# Patient Record
Sex: FEMALE | Race: BLACK OR AFRICAN AMERICAN | NOT HISPANIC OR LATINO | Employment: FULL TIME | URBAN - METROPOLITAN AREA
[De-identification: names, ages, dates, MRNs, and addresses within clinical notes are randomized per-mention and may not be internally consistent; named-entity substitution may affect disease eponyms.]

---

## 2023-10-29 ENCOUNTER — HOSPITAL ENCOUNTER (EMERGENCY)
Facility: HOSPITAL | Age: 23
Discharge: HOME | End: 2023-10-29
Payer: MEDICAID

## 2023-10-29 ENCOUNTER — APPOINTMENT (OUTPATIENT)
Dept: RADIOLOGY | Facility: HOSPITAL | Age: 23
End: 2023-10-29
Payer: MEDICAID

## 2023-10-29 VITALS
SYSTOLIC BLOOD PRESSURE: 108 MMHG | BODY MASS INDEX: 20.87 KG/M2 | HEIGHT: 66 IN | HEART RATE: 78 BPM | OXYGEN SATURATION: 100 % | WEIGHT: 129.85 LBS | DIASTOLIC BLOOD PRESSURE: 76 MMHG | TEMPERATURE: 97.9 F

## 2023-10-29 DIAGNOSIS — S49.92XA INJURY OF LEFT SHOULDER, INITIAL ENCOUNTER: Primary | ICD-10-CM

## 2023-10-29 PROCEDURE — 73030 X-RAY EXAM OF SHOULDER: CPT | Mod: LT

## 2023-10-29 PROCEDURE — 99283 EMERGENCY DEPT VISIT LOW MDM: CPT | Mod: 25

## 2023-10-29 PROCEDURE — 73030 X-RAY EXAM OF SHOULDER: CPT | Mod: LEFT SIDE | Performed by: RADIOLOGY

## 2023-10-29 RX ORDER — ACETAMINOPHEN 500 MG
1000 TABLET ORAL EVERY 8 HOURS PRN
Qty: 18 TABLET | Refills: 0 | Status: SHIPPED | OUTPATIENT
Start: 2023-10-29 | End: 2023-11-01

## 2023-10-29 RX ORDER — ACETAMINOPHEN 325 MG/1
975 TABLET ORAL ONCE
Status: COMPLETED | OUTPATIENT
Start: 2023-10-29 | End: 2023-10-29

## 2023-10-29 RX ADMIN — ACETAMINOPHEN 975 MG: 325 TABLET ORAL at 16:16

## 2023-10-29 ASSESSMENT — PAIN DESCRIPTION - ORIENTATION: ORIENTATION: LEFT

## 2023-10-29 ASSESSMENT — COLUMBIA-SUICIDE SEVERITY RATING SCALE - C-SSRS
2. HAVE YOU ACTUALLY HAD ANY THOUGHTS OF KILLING YOURSELF?: NO
6. HAVE YOU EVER DONE ANYTHING, STARTED TO DO ANYTHING, OR PREPARED TO DO ANYTHING TO END YOUR LIFE?: NO
1. IN THE PAST MONTH, HAVE YOU WISHED YOU WERE DEAD OR WISHED YOU COULD GO TO SLEEP AND NOT WAKE UP?: NO

## 2023-10-29 ASSESSMENT — PAIN SCALES - GENERAL: PAINLEVEL_OUTOF10: 8

## 2023-10-29 ASSESSMENT — PAIN DESCRIPTION - PAIN TYPE: TYPE: ACUTE PAIN

## 2023-10-29 ASSESSMENT — PAIN - FUNCTIONAL ASSESSMENT: PAIN_FUNCTIONAL_ASSESSMENT: 0-10

## 2023-10-29 ASSESSMENT — PAIN DESCRIPTION - LOCATION: LOCATION: SHOULDER

## 2023-10-29 NOTE — ED PROVIDER NOTES
"HPI   Chief Complaint   Patient presents with    Shoulder Injury     Pt is coming from work with left shoulder pain due to an injury at work. 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 Pt is a&ox4, warm and dry. PMS intact. No other injuries reported.        23-year-old female with no significant past medical history presents the ED today for chief concern of left shoulder pain.  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  She states that she \"felt a pull\" in her left shoulder and had pain in the lateral aspect of the left shoulder.  She describes the pain as throbbing.  She did not take any medicines for symptoms.  The injury occurred an hour ago.  She did not fall to the ground, hit her head, or lose consciousness.  She is not anticoagulated.  Describes the pain as aching worse with movement.  Denies any numbness or tingling.  Denies any weakness.  No fever/chills, nausea/vomiting.  Denies any chest pain or shortness of breath.  Denies any neck pain or elbow pain.  No other symptoms or concerns at this time.  First date of last menstrual period was about a month ago.  No concern for pregnancy at this time.  Denies any other injuries.      History provided by:  Patient and relative   used: No                        No data recorded                Patient History   No past medical history on file.  No past surgical history on file.  No family history on file.  Social History     Tobacco Use    Smoking status: Not on file    Smokeless tobacco: Not on file   Substance Use Topics    Alcohol use: Not on file    Drug use: Not on file       Physical Exam   ED Triage Vitals [10/29/23 1553]   Temp Heart Rate Resp BP   36.6 °C (97.9 °F) 78 -- 108/76      SpO2 Temp Source Heart Rate Source Patient Position   100 % Oral -- Sitting      BP Location FiO2 (%)     Right arm --       Physical Exam  Constitutional: Vital signs per nursing notes.  Well " developed, well nourished.  No acute distress.    Psychiatric: alert and oriented to person, place, and time; no abnormalities of mood or affect; memory intact  Eyes: conjunctivae and lids normal  ENT: Ears normal externally; face symmetric. voice normal  Neck: neck supple, no meningismus; trachea midline without deviation  Respiratory: normal respiratory effort and excursion; no rales, rhonchi, or wheezes; equal air entry  Cardiovascular: RRR, 2+ radial pulses extremities   Neurological: normal speech; CN II-XII grossly intact, normal motor and sensory function; no nystagmus; ; no ataxia.  GI: no distention, soft, nontender  : Deferred  Musculoskeletal: normal gait and station; normal digits and nails; no overlying skin changes or obvious deformity to the left shoulder.  Minimal tenderness palpation over the lateral aspect of the left shoulder as well as posterior aspect of left shoulder.  Remainder of shoulder unremarkable.  Full range of motion of the neck.  No C-spine tenderness.  Full range of motion of the left elbow without any focal bony tenderness palpation.  Full active and passive range of motion of the left shoulder.  Pain with active range of motion of the left shoulder.  5/5 strength in upper extremities bilaterally.  Sensation is intact in the upper extremities bilaterally.  Neurovascular status intact.  Compartments are soft.  2+ equal radial pulses bilaterally.  No cyanosis.  Temperature is equal in the upper extremities bilaterally.  Negative drop arm test.  Negative Jed test.  Negative external rotation resistance test.  Negative empty can test.  Negative hook test.  Skin: normal to inspection; normal to palpation; no rash    ED Course & MDM   ED Course as of 10/29/23 1748   Sun Oct 29, 2023   1738 FINDINGS:  3 portable views of the left shoulder show no fracture, dislocation  or lytic or blastic lesion. There is no evidence of arthritis or  calcific tendinitis.      IMPRESSION:  Normal  study. []      ED Course User Index  [MC] Bruce Valentin PA-C         Diagnoses as of 10/29/23 1748   Injury of left shoulder, initial encounter       Medical Decision Making  23-year-old female with no significant past medical history presents the ED today for chief concern of left shoulder injury.  Vital signs are reassuring.  Patient overall appears well and is nontoxic-appearing.  She is given Tylenol in the ED.  The shoulder pain was due to an injury.  Patient felt much better after administration of Tylenol in the ED.  She has clear musculoskeletal tenderness noted on examination.  No weakness is noted. SITS tendons appear to be intact.  No weakness is noted.  I have low suspicion for any tendon injury at this time.  She has pain with active range of motion, no weakness.  No signs or symptoms of any ACS at this time.  Neurovascular status intact in upper extremities bilaterally.  X-rays negative for any fracture.  Discussed my impression and findings with patient and she feels comfortable returning home.  We discussed very strict return precautions including returning for any new or worsening signs or symptoms.  Patient is in agreement this plan.  She will follow-up with her PCP and orthopedics within 3 days.  Shared decision-making was used and sling will be deferred at this time.  Discharged with Tylenol.  Discussed with patient and sister that x-rays can be negative for fracture up to 14 days after the initial injury.  I do not think immobilization is necessary at this time as patient has no focal bony tenderness.  Low suspicion for any occult fracture at this time.    Differential diagnosis: Fracture, dislocation, ligamentous injury, ACS, aortic dissection    Disposition/treatment  1.  Injury of left shoulder    Shared decision-making was used patient feels comfortable returning home        Procedure  Procedures     Bruce Valentin PA-C  10/29/23 2367

## 2023-10-29 NOTE — DISCHARGE INSTRUCTIONS
Please return to the ED immediately if you have any new or worsening signs or symptoms.  Return to the ED immediately if you have any pain out of proportion not being relieved by analgesics.

## 2023-11-03 ENCOUNTER — HOSPITAL ENCOUNTER (EMERGENCY)
Facility: HOSPITAL | Age: 23
Discharge: HOME | End: 2023-11-03
Attending: EMERGENCY MEDICINE
Payer: COMMERCIAL

## 2023-11-03 ENCOUNTER — APPOINTMENT (OUTPATIENT)
Dept: RADIOLOGY | Facility: HOSPITAL | Age: 23
End: 2023-11-03
Payer: COMMERCIAL

## 2023-11-03 VITALS
DIASTOLIC BLOOD PRESSURE: 68 MMHG | RESPIRATION RATE: 18 BRPM | WEIGHT: 132.28 LBS | SYSTOLIC BLOOD PRESSURE: 124 MMHG | TEMPERATURE: 98.1 F | HEART RATE: 86 BPM | OXYGEN SATURATION: 100 % | BODY MASS INDEX: 20.76 KG/M2 | HEIGHT: 67 IN

## 2023-11-03 DIAGNOSIS — S49.92XA INJURY OF LEFT SHOULDER, INITIAL ENCOUNTER: Primary | ICD-10-CM

## 2023-11-03 LAB
APPEARANCE UR: ABNORMAL
BACTERIA #/AREA URNS AUTO: ABNORMAL /HPF
BILIRUB UR STRIP.AUTO-MCNC: NEGATIVE MG/DL
COLOR UR: YELLOW
GLUCOSE UR STRIP.AUTO-MCNC: NEGATIVE MG/DL
HCG UR QL IA.RAPID: NEGATIVE
KETONES UR STRIP.AUTO-MCNC: NEGATIVE MG/DL
LEUKOCYTE ESTERASE UR QL STRIP.AUTO: ABNORMAL
MUCOUS THREADS #/AREA URNS AUTO: ABNORMAL /LPF
NITRITE UR QL STRIP.AUTO: NEGATIVE
PH UR STRIP.AUTO: 6 [PH]
PROT UR STRIP.AUTO-MCNC: NEGATIVE MG/DL
RBC # UR STRIP.AUTO: NEGATIVE /UL
RBC #/AREA URNS AUTO: ABNORMAL /HPF
SP GR UR STRIP.AUTO: 1.02
SQUAMOUS #/AREA URNS AUTO: ABNORMAL /HPF
UROBILINOGEN UR STRIP.AUTO-MCNC: <2 MG/DL
WBC #/AREA URNS AUTO: ABNORMAL /HPF

## 2023-11-03 PROCEDURE — 73030 X-RAY EXAM OF SHOULDER: CPT | Mod: LEFT SIDE | Performed by: RADIOLOGY

## 2023-11-03 PROCEDURE — 96372 THER/PROPH/DIAG INJ SC/IM: CPT

## 2023-11-03 PROCEDURE — 2500000005 HC RX 250 GENERAL PHARMACY W/O HCPCS

## 2023-11-03 PROCEDURE — 81025 URINE PREGNANCY TEST: CPT

## 2023-11-03 PROCEDURE — 73030 X-RAY EXAM OF SHOULDER: CPT | Mod: LT,FR

## 2023-11-03 PROCEDURE — 2500000004 HC RX 250 GENERAL PHARMACY W/ HCPCS (ALT 636 FOR OP/ED)

## 2023-11-03 PROCEDURE — 87086 URINE CULTURE/COLONY COUNT: CPT | Mod: AHULAB

## 2023-11-03 PROCEDURE — 81001 URINALYSIS AUTO W/SCOPE: CPT

## 2023-11-03 PROCEDURE — 99284 EMERGENCY DEPT VISIT MOD MDM: CPT | Mod: 25 | Performed by: EMERGENCY MEDICINE

## 2023-11-03 RX ORDER — LIDOCAINE 50 MG/G
1 PATCH TOPICAL DAILY
Qty: 5 PATCH | Refills: 0 | Status: SHIPPED | OUTPATIENT
Start: 2023-11-03 | End: 2023-11-03 | Stop reason: WASHOUT

## 2023-11-03 RX ORDER — LIDOCAINE 50 MG/G
1 PATCH TOPICAL DAILY
Qty: 5 PATCH | Refills: 0 | Status: SHIPPED | OUTPATIENT
Start: 2023-11-03 | End: 2023-11-08

## 2023-11-03 RX ORDER — KETOROLAC TROMETHAMINE 30 MG/ML
30 INJECTION, SOLUTION INTRAMUSCULAR; INTRAVENOUS ONCE
Status: COMPLETED | OUTPATIENT
Start: 2023-11-03 | End: 2023-11-03

## 2023-11-03 RX ORDER — ACETAMINOPHEN 500 MG
1000 TABLET ORAL EVERY 8 HOURS PRN
Qty: 18 TABLET | Refills: 0 | Status: SHIPPED | OUTPATIENT
Start: 2023-11-03 | End: 2023-11-06

## 2023-11-03 RX ORDER — CYCLOBENZAPRINE HCL 10 MG
10 TABLET ORAL NIGHTLY
Qty: 3 TABLET | Refills: 0 | Status: SHIPPED | OUTPATIENT
Start: 2023-11-03 | End: 2023-11-17 | Stop reason: ALTCHOICE

## 2023-11-03 RX ORDER — LIDOCAINE 560 MG/1
1 PATCH PERCUTANEOUS; TOPICAL; TRANSDERMAL ONCE
Status: DISCONTINUED | OUTPATIENT
Start: 2023-11-03 | End: 2023-11-03 | Stop reason: HOSPADM

## 2023-11-03 RX ORDER — ACETAMINOPHEN 325 MG/1
975 TABLET ORAL ONCE
Status: COMPLETED | OUTPATIENT
Start: 2023-11-03 | End: 2023-11-03

## 2023-11-03 RX ORDER — NAPROXEN 500 MG/1
500 TABLET ORAL EVERY 12 HOURS
Qty: 6 TABLET | Refills: 0 | Status: SHIPPED | OUTPATIENT
Start: 2023-11-03 | End: 2023-11-06

## 2023-11-03 RX ADMIN — ACETAMINOPHEN 975 MG: 325 TABLET ORAL at 16:01

## 2023-11-03 RX ADMIN — LIDOCAINE 1 PATCH: 4 PATCH TOPICAL at 16:01

## 2023-11-03 RX ADMIN — KETOROLAC TROMETHAMINE 30 MG: 30 INJECTION, SOLUTION INTRAMUSCULAR at 17:20

## 2023-11-03 ASSESSMENT — PAIN - FUNCTIONAL ASSESSMENT: PAIN_FUNCTIONAL_ASSESSMENT: 0-10

## 2023-11-03 ASSESSMENT — PAIN SCALES - GENERAL: PAINLEVEL_OUTOF10: 7

## 2023-11-03 ASSESSMENT — COLUMBIA-SUICIDE SEVERITY RATING SCALE - C-SSRS
2. HAVE YOU ACTUALLY HAD ANY THOUGHTS OF KILLING YOURSELF?: NO
1. IN THE PAST MONTH, HAVE YOU WISHED YOU WERE DEAD OR WISHED YOU COULD GO TO SLEEP AND NOT WAKE UP?: NO
6. HAVE YOU EVER DONE ANYTHING, STARTED TO DO ANYTHING, OR PREPARED TO DO ANYTHING TO END YOUR LIFE?: NO

## 2023-11-03 ASSESSMENT — PAIN DESCRIPTION - LOCATION: LOCATION: SHOULDER

## 2023-11-03 ASSESSMENT — PAIN DESCRIPTION - ORIENTATION: ORIENTATION: LEFT

## 2023-11-03 NOTE — DISCHARGE INSTRUCTIONS
Return to the emergency department immediately if you are having any new or worsening signs or symptoms

## 2023-11-03 NOTE — Clinical Note
Jacqui Spaulding was seen and treated in our emergency department on 11/3/2023.  She may return to work on 11/04/2023.       If you have any questions or concerns, please don't hesitate to call.      Bruce Valentin PA-C

## 2023-11-03 NOTE — ED PROVIDER NOTES
HPI   Chief Complaint   Patient presents with    Shoulder Injury     LEFT       23-year-old female with no significant past medical history presents the ED today with a chief concern of left shoulder pain.  Patient states symptoms started 6 days ago.  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  She states that she felt some pain in her left shoulder at the time.  She states that the pain is located over the lateral aspect of the left shoulder without any radiation.  Describes the pain as aching.  Denies any exacerbating or relieving factors.  She has been taking ibuprofen at home for her symptoms.  She states that she was here 10/29/2023.  She states that she was given analgesics but they were sent to the wrong pharmacy so she did not pick them up.  She states that her coworkers told her to come to the ED to get reevaluated.  She states that she did not follow-up with orthopedics.  She has no fever/chills, nausea/vomiting, chest pain, shortness of breath.  No other symptoms or concerns at this time.  Denies any numbness or weakness or tingling.      History provided by:  Patient   used: No                        No data recorded                Patient History   No past medical history on file.  No past surgical history on file.  No family history on file.  Social History     Tobacco Use    Smoking status: Not on file    Smokeless tobacco: Not on file   Substance Use Topics    Alcohol use: Not on file    Drug use: Not on file       Physical Exam   ED Triage Vitals   Temp Heart Rate Resp BP   11/03/23 1414 11/03/23 1414 11/03/23 1414 11/03/23 1414   36.7 °C (98.1 °F) 86 18 124/68      SpO2 Temp Source Heart Rate Source Patient Position   11/03/23 1413 11/03/23 1414 11/03/23 1414 11/03/23 1414   100 % Tympanic Monitor Sitting      BP Location FiO2 (%)     11/03/23 1414 --     Right arm        Physical Exam  Constitutional: Vital signs per nursing notes.  Well  developed, well nourished.  No acute distress.    Psychiatric: alert and oriented to person, place, and time; no abnormalities of mood or affect; memory intact  Eyes: conjunctivae and lids normal  ENT: Ears normal externally; face symmetric. voice normal  Neck: neck supple, no meningismus; trachea midline without deviation  Respiratory: normal respiratory effort and excursion; no rales, rhonchi, or wheezes; equal air entry  Cardiovascular: RRR, 2+ radial pulses extremities   Neurological: normal speech; CN II-XII grossly intact, normal motor and sensory function no ataxia.  GI: no distention, soft, nontender  : Deferred  Musculoskeletal: normal gait and station; normal digits and nails; no overlying skin changes to the left shoulder.  Full range of motion of the shoulders and elbows bilaterally.  Tenderness palpation over the lateral aspect of the left shoulder without any radiation.  Tenderness palpation over the bicipital groove.  5/5 strength in upper extremities bilaterally.  Sensation is intact in the upper extremities bilaterally.  Neurovascular status intact compartments are soft.  No cyanosis.  2+ metric radial pulses bilaterally.  Negative Jed test.  Negative external rotation test.  Negative drop arm test.   Skin: normal to inspection; normal to palpation; no rash    ED Course & MDM   ED Course as of 11/03/23 1726   Fri Nov 03, 2023   1537 IMPRESSION:  No acute osseous abnormality.   []   1604 Urine pregnancy test negative. []   1605 Urinalysis shows no UTI []      ED Course User Index  [] Bruce Valentin PA-C         Diagnoses as of 11/03/23 1726   Injury of left shoulder, initial encounter       Medical Decision Making  23-year-old female with no significant past medical history presents the ED today for chief concern of left shoulder pain.  Vital signs are reassuring.  Patient overall appears well and is nontoxic-appearing.  X-ray shows no evidence of fracture.  Neurovascular status intact  in upper extremities bilaterally.  She was given a dose of Toradol in the ED.  Her ligamentous testing is intact.  I do not think immobilization sling is indicated at this time.  Urinalysis unremarkable.  Signs and symptoms likely related to contusion versus sprain.  Discussed my impression and findings with patient and she feels comfortable returning home.  No concerning findings for ACS at this time.  2+ symmetric radial pulses bilaterally.  We discussed very strict return precautions including returning for any new or worsening signs or symptoms.  Patient is in agreement this plan.  She will follow-up with her PCP and orthopedics within 3 days.  Discharged with Flexeril, naproxen, and topical lidocaine patches.  These were printed out so she can take with to any pharmacy.    Differential diagnosis: Strain, sprain, ligamentous injury, septic arthritis, crystal arthropathy, fracture    Disposition/treatment  1.  Injury of left shoulder    Shared decision-making was used patient feels comfortable returning home        Procedure  Procedures     Bruce Valentin PA-C  11/03/23 2898

## 2023-11-03 NOTE — ED TRIAGE NOTES
C/O LEFT SHOULDER PAIN D/T INJURING IT AT WORK ON SUNDAY, INCREASED PAIN WITH ROM, DENIES TAKING ANY OTC MEDS FOR PAIN RELIEF

## 2023-11-05 LAB — BACTERIA UR CULT: NORMAL

## 2023-11-17 ENCOUNTER — OFFICE VISIT (OUTPATIENT)
Dept: ORTHOPEDIC SURGERY | Facility: CLINIC | Age: 23
End: 2023-11-17
Payer: MEDICAID

## 2023-11-17 VITALS — WEIGHT: 132.28 LBS | BODY MASS INDEX: 21.26 KG/M2 | HEIGHT: 66 IN

## 2023-11-17 DIAGNOSIS — M25.512 LEFT SHOULDER PAIN, UNSPECIFIED CHRONICITY: Primary | ICD-10-CM

## 2023-11-17 DIAGNOSIS — S43.402A SPRAIN OF LEFT SHOULDER, UNSPECIFIED SHOULDER SPRAIN TYPE, INITIAL ENCOUNTER: ICD-10-CM

## 2023-11-17 PROCEDURE — 1036F TOBACCO NON-USER: CPT | Performed by: PHYSICIAN ASSISTANT

## 2023-11-17 PROCEDURE — 99213 OFFICE O/P EST LOW 20 MIN: CPT | Performed by: PHYSICIAN ASSISTANT

## 2023-11-17 PROCEDURE — 99203 OFFICE O/P NEW LOW 30 MIN: CPT | Performed by: PHYSICIAN ASSISTANT

## 2023-11-17 ASSESSMENT — PATIENT HEALTH QUESTIONNAIRE - PHQ9
SUM OF ALL RESPONSES TO PHQ9 QUESTIONS 1 AND 2: 0
1. LITTLE INTEREST OR PLEASURE IN DOING THINGS: NOT AT ALL
2. FEELING DOWN, DEPRESSED OR HOPELESS: NOT AT ALL

## 2023-11-17 ASSESSMENT — ENCOUNTER SYMPTOMS
LOSS OF SENSATION IN FEET: 0
DEPRESSION: 0
OCCASIONAL FEELINGS OF UNSTEADINESS: 0

## 2023-11-17 ASSESSMENT — PAIN - FUNCTIONAL ASSESSMENT: PAIN_FUNCTIONAL_ASSESSMENT: 0-10

## 2023-11-17 ASSESSMENT — PAIN DESCRIPTION - DESCRIPTORS: DESCRIPTORS: THROBBING;ACHING

## 2023-11-17 ASSESSMENT — PAIN SCALES - GENERAL: PAINLEVEL_OUTOF10: 8

## 2023-11-17 NOTE — LETTER
November 17, 2023     Patient: Jacqui Spaulding   YOB: 2000   Date of Visit: 11/17/2023       To Whom It May Concern:    It is my medical opinion that Jacqui Spaulding may return to work on 1/2/2024 .    If you have any questions or concerns, please don't hesitate to call.         Sincerely,        Akanksha Gamez PA-C    CC: No Recipients

## 2023-11-17 NOTE — PROGRESS NOTES
Subjective      Chief Complaint   Patient presents with    Left Shoulder - Follow-up        No past surgical history on file.     HPI  This 23 year old patient presents today with left shoulder pain 8. The patient states that the left shoulder pain has been present since October 29th. She has been to the ER twice on 10/29/2023 and 11/3/2023 for left shoulder pain. She denies cardiac history. She denies chest pain, and shortness of breath. She was at work when she suffered an injury to her left shoulder on 10/29/2023. She was picking up a case at work when she felt a pop in her left shoulder. The patient states that the left shoulder pain is worse with and aggravated by reaching and lifting. It impairs her ability to do her normal activities of daily living including her duties at her job. The patient states that this shoulder pain is disabling and presents today to discuss further options. The patient states that they have tried tylenol and ibuprofen with no relief.    CARDIOLOGY:   Negative for chest pain, shortness of breath.   RESPIRATORY:   Negative for chest pain, shortness of breath.   MUSCULOSKELETAL:   See HPI for details.   NEUROLOGY:   Negative for tingling, numbness, weakness.    Objective      There were no vitals taken for this visit.     SHOULDER EXAM  Constitutional: Appears stated age. No apparent distress  Labored Breathing: No  Psychiatric: Normal mood and effect.   Neurological: alert and oriented x3  Skin: intact  HEENT: No bruising, otorrhea, rhinorrhea.  MUSCULOSKELETAL: Neck: No tenderness. No pain or limitation with range of motion. Back: No tenderness. Straight leg test negative bilaterally. left shoulder: There is tenderness anteriorly and laterally. Active abduction and active flexion are 0-120 degrees but with pain and guarding. There is pain with and limitation of active and passive internal and external rotation. Comparments are soft. Neurovascular is intact.     AP lateral x-ray of the  left shoulder done on 10/29/2023 and again on 11/3/2023 show no evidence of acute fracture or bone destruction.    Jacqui was seen today for follow-up.  Diagnoses and all orders for this visit:  Left shoulder pain, unspecified chronicity (Primary)  Sprain of left shoulder, unspecified shoulder sprain type, initial encounter     Options are discussed with the patient in detail. The patient is given a prescription for physical therapy to evaluate and treat with gentle strengthening and ROM exercises with modalities as needed. We will apply to the [place] [employer]. for approval for physical therapy the patient is instructed regarding activity modification and risk for further injury with falling or trauma, ice, provider directed at home gentle strengthening and ROM exercises, and the appropriate use of Tylenol as needed for pain with its potential adverse reactions and side effects. The patient understands.  I estimate that this patient is able to return to work on January 2, 2024 and she is given a note regarding this in office today. Return in 4 weeks for re-evaluation and consideration of MRI if left shoulder pain persists or sooner as needed.  please note that this report has been produced using speech recognition software.  It may contain errors related to grammar, punctuation or spelling.  Electronically signed, but not reviewed.    Akanksha Gamez PA-C

## 2023-11-17 NOTE — PATIENT INSTRUCTIONS
Thank you for coming to see us today!     Continue to use tylenol for pain control.   Rest, ice 20 mins three times a day  We are going to give you a referral for physical therapy. Dont call to schedule until we get approval from [facility].  We also will give you an excuse from work for 6 weeks.     Follow up in 4 weeks for MRI if the left shoulder pain doesn't improve.

## 2023-12-28 ENCOUNTER — TELEPHONE (OUTPATIENT)
Dept: ORTHOPEDIC SURGERY | Facility: CLINIC | Age: 23
End: 2023-12-28
Payer: COMMERCIAL

## 2023-12-28 NOTE — TELEPHONE ENCOUNTER
Patient called to r/s her no show appointment of last week.  I told patient that her PT had been approved by [facility] and she could schedule that

## 2024-01-03 ENCOUNTER — OFFICE VISIT (OUTPATIENT)
Dept: ORTHOPEDIC SURGERY | Facility: CLINIC | Age: 24
End: 2024-01-03
Payer: COMMERCIAL

## 2024-01-03 VITALS — BODY MASS INDEX: 21.35 KG/M2 | WEIGHT: 132.28 LBS

## 2024-01-03 DIAGNOSIS — M25.512 LEFT SHOULDER PAIN, UNSPECIFIED CHRONICITY: Primary | ICD-10-CM

## 2024-01-03 DIAGNOSIS — S43.402A SPRAIN OF LEFT SHOULDER, UNSPECIFIED SHOULDER SPRAIN TYPE, INITIAL ENCOUNTER: ICD-10-CM

## 2024-01-03 PROCEDURE — 99213 OFFICE O/P EST LOW 20 MIN: CPT | Performed by: PHYSICIAN ASSISTANT

## 2024-01-03 PROCEDURE — 1036F TOBACCO NON-USER: CPT | Performed by: PHYSICIAN ASSISTANT

## 2024-01-03 ASSESSMENT — PATIENT HEALTH QUESTIONNAIRE - PHQ9
2. FEELING DOWN, DEPRESSED OR HOPELESS: NOT AT ALL
SUM OF ALL RESPONSES TO PHQ9 QUESTIONS 1 AND 2: 0
1. LITTLE INTEREST OR PLEASURE IN DOING THINGS: NOT AT ALL

## 2024-01-03 ASSESSMENT — ENCOUNTER SYMPTOMS
OCCASIONAL FEELINGS OF UNSTEADINESS: 0
DEPRESSION: 0
LOSS OF SENSATION IN FEET: 0

## 2024-01-03 ASSESSMENT — PAIN - FUNCTIONAL ASSESSMENT: PAIN_FUNCTIONAL_ASSESSMENT: 0-10

## 2024-01-03 ASSESSMENT — PAIN DESCRIPTION - DESCRIPTORS: DESCRIPTORS: ACHING

## 2024-01-03 ASSESSMENT — PAIN SCALES - GENERAL: PAINLEVEL_OUTOF10: 7

## 2024-01-03 NOTE — LETTER
January 3, 2024     Patient: Jacqui Spaulding   YOB: 2000   Date of Visit: 1/3/2024       To Whom It May Concern:    It is my medical opinion that Jacqui Spaulding may return to work on 1/29/2024 .    If you have any questions or concerns, please don't hesitate to call.         Sincerely,        Akanksha Gamez PA-C    CC: No Recipients

## 2024-01-03 NOTE — PROGRESS NOTES
Subjective      Chief Complaint   Patient presents with    Left Shoulder - Follow-up        Past Surgical History:   Procedure Laterality Date    UMBILICAL HERNIA REPAIR      as a baby        HPI  This 23 year old patient presents today with left shoulder pain 8. The patient states that the left shoulder pain has been present since October 29th. She has been to the ER twice on 10/29/2023 and 11/3/2023 for left shoulder pain. She denies cardiac history. She denies chest pain, and shortness of breath. She was at work when she suffered an injury to her left shoulder on 10/29/2023. She was picking up a case at work when she felt a pop in her left shoulder. The patient states that the left shoulder pain is worse with and aggravated by reaching and lifting. It impairs her ability to do her normal activities of daily living including her duties at her job.  She has tried at home exercises including gentle strengthening and range of motion exercises with no relief of the left shoulder pain.  She has tried ibuprofen and Tylenol with no relief of the left shoulder pain.  Activity modification has not relieved her left shoulder pain.  The patient states that this shoulder pain is disabling and presents today to discuss further options. The patient states that they have tried tylenol and ibuprofen with no relief.    CARDIOLOGY:   Negative for chest pain, shortness of breath.   RESPIRATORY:   Negative for chest pain, shortness of breath.   MUSCULOSKELETAL:   See HPI for details.   NEUROLOGY:   Negative for tingling, numbness, weakness.    Objective      There were no vitals taken for this visit.     SHOULDER EXAM  Constitutional: Appears stated age. No apparent distress  Labored Breathing: No  Psychiatric: Normal mood and effect.   Neurological: alert and oriented x3  Skin: intact  HEENT: No bruising, otorrhea, rhinorrhea.  MUSCULOSKELETAL: Neck: No tenderness. No pain or limitation with range of motion. Back: No tenderness.  Straight leg test negative bilaterally. left shoulder: There is tenderness anteriorly and laterally. Active abduction and active flexion are 0-120 degrees but with pain and guarding. There is pain with and limitation of active and passive internal and external rotation. Comparments are soft. Neurovascular is intact.     AP lateral x-ray of the left shoulder done on 10/29/2023 and again on 11/3/2023 show no evidence of acute fracture or bone destruction.    Jacqui was seen today for follow-up.  Diagnoses and all orders for this visit:  Left shoulder pain, unspecified chronicity (Primary)  Sprain of left shoulder, unspecified shoulder sprain type, initial encounter  Other orders  -     Referral to Orthopaedic Surgery     Options are discussed with the patient in detail. The patient is instructed to continue with physical therapy.  An MRI of the left shoulder was ordered in office today to further evaluate the patient's debilitating left shoulder pain.  We will apply to the [place] of Worker's [employer]. for approval for the left shoulder MRI.  The patient is instructed regarding activity modification and risk for further injury with falling or trauma, ice, provider directed at home gentle strengthening and ROM exercises, and the appropriate use of Tylenol as needed for pain with its potential adverse reactions and side effects. The patient understands.  I estimate that this patient is able to return to work on January 29, 2024 and she is given a note regarding this in office today. Return in 2 weeks for re-evaluation and consideration or sooner as needed.  please note that this report has been produced using speech recognition software.  It may contain errors related to grammar, punctuation or spelling.  Electronically signed, but not reviewed.    Akanksha Gamez PA-C

## 2024-01-03 NOTE — PATIENT INSTRUCTIONS
Thank you for coming to see us today!     Continue to use tylenol for pain control.   Rest, ice and elevate and remember to do exercises.   Continue to work with physical therapy  We are going to give you a referral for MRI of the left shoulder     Follow up after MRI is complete

## 2024-01-10 ENCOUNTER — TELEPHONE (OUTPATIENT)
Dept: ORTHOPEDIC SURGERY | Facility: CLINIC | Age: 24
End: 2024-01-10
Payer: COMMERCIAL

## 2024-01-10 NOTE — TELEPHONE ENCOUNTER
Left message that MRI was approved with [facility].  To please call the office that she did get this message

## 2024-01-15 NOTE — TELEPHONE ENCOUNTER
Patient called back, understand that she is to call and schedule the MRI.  She will then call US back with the date and we will have her come in for the follow up

## 2024-01-25 ENCOUNTER — HOSPITAL ENCOUNTER (OUTPATIENT)
Dept: RADIOLOGY | Facility: HOSPITAL | Age: 24
Discharge: HOME | End: 2024-01-25
Payer: COMMERCIAL

## 2024-01-25 DIAGNOSIS — S43.402A SPRAIN OF LEFT SHOULDER, UNSPECIFIED SHOULDER SPRAIN TYPE, INITIAL ENCOUNTER: ICD-10-CM

## 2024-01-25 PROCEDURE — 73221 MRI JOINT UPR EXTREM W/O DYE: CPT | Mod: LT

## 2024-02-01 RX ORDER — IBUPROFEN 800 MG/1
800 TABLET ORAL EVERY 6 HOURS PRN
COMMUNITY
Start: 2022-10-01

## 2024-02-05 ENCOUNTER — OFFICE VISIT (OUTPATIENT)
Dept: ORTHOPEDIC SURGERY | Facility: CLINIC | Age: 24
End: 2024-02-05
Payer: COMMERCIAL

## 2024-02-05 VITALS — BODY MASS INDEX: 20.99 KG/M2 | WEIGHT: 130.07 LBS

## 2024-02-05 DIAGNOSIS — S43.402A SPRAIN OF LEFT SHOULDER, UNSPECIFIED SHOULDER SPRAIN TYPE, INITIAL ENCOUNTER: ICD-10-CM

## 2024-02-05 DIAGNOSIS — M25.512 LEFT SHOULDER PAIN, UNSPECIFIED CHRONICITY: Primary | ICD-10-CM

## 2024-02-05 PROCEDURE — 1036F TOBACCO NON-USER: CPT | Performed by: ORTHOPAEDIC SURGERY

## 2024-02-05 PROCEDURE — 99213 OFFICE O/P EST LOW 20 MIN: CPT | Performed by: ORTHOPAEDIC SURGERY

## 2024-02-05 ASSESSMENT — PATIENT HEALTH QUESTIONNAIRE - PHQ9
1. LITTLE INTEREST OR PLEASURE IN DOING THINGS: NOT AT ALL
SUM OF ALL RESPONSES TO PHQ9 QUESTIONS 1 AND 2: 0
2. FEELING DOWN, DEPRESSED OR HOPELESS: NOT AT ALL

## 2024-02-05 ASSESSMENT — ENCOUNTER SYMPTOMS
DEPRESSION: 0
LOSS OF SENSATION IN FEET: 0
OCCASIONAL FEELINGS OF UNSTEADINESS: 0

## 2024-02-05 ASSESSMENT — PAIN SCALES - GENERAL: PAINLEVEL_OUTOF10: 7

## 2024-02-05 ASSESSMENT — LIFESTYLE VARIABLES: TOTAL SCORE: 0

## 2024-02-05 ASSESSMENT — PAIN - FUNCTIONAL ASSESSMENT: PAIN_FUNCTIONAL_ASSESSMENT: 0-10

## 2024-02-05 ASSESSMENT — PAIN DESCRIPTION - DESCRIPTORS: DESCRIPTORS: ACHING

## 2024-02-05 NOTE — PATIENT INSTRUCTIONS
Thank you for coming to see us today!     Continue to use tylenol for pain control.   Rest, ice and elevate and remember to do exercises.   Continue  physical therapy.     Follow up in 5 weeks or sooner as needed.

## 2024-02-05 NOTE — PROGRESS NOTES
Subjective      Chief Complaint   Patient presents with    Left Shoulder - Pain     MRI results        Past Surgical History:   Procedure Laterality Date    UMBILICAL HERNIA REPAIR      as a baby        [facility] [place]  This 23 year old patient presents today with left shoulder pain 8. The patient states that the left shoulder pain has been present since October 29th. She has been to the ER twice on 10/29/2023 and 11/3/2023 for left shoulder pain. She denies cardiac history. She denies chest pain, and shortness of breath. She was at work when she suffered an injury to her left shoulder on 10/29/2023. She was picking up a case at work when she felt a pop in her left shoulder. The patient states that the left shoulder pain is worse with and aggravated by reaching and lifting. It impairs her ability to do her normal activities of daily living including her duties at her job.  She has tried at home exercises including gentle strengthening and range of motion exercises with no relief of the left shoulder pain.  She has tried ibuprofen and Tylenol with no relief of the left shoulder pain.  Activity modification has not relieved her left shoulder pain.  The patient states that this shoulder pain is disabling and presents today to discuss further options. The patient states that they have tried tylenol and ibuprofen with no relief. She presents today for MRI of the left shoulder results.     CARDIOLOGY:   Negative for chest pain, shortness of breath.   RESPIRATORY:   Negative for chest pain, shortness of breath.   MUSCULOSKELETAL:   See [facility] [place] for details.   NEUROLOGY:   Negative for tingling, numbness, weakness.    Objective      There were no vitals taken for this visit.     SHOULDER EXAM  Constitutional: Appears stated age. No apparent distress  Labored Breathing: No  Psychiatric: Normal mood and effect.   Neurological: alert and oriented x3  Skin: intact  HEENT: No bruising, otorrhea, rhinorrhea.  MUSCULOSKELETAL: Neck: No tenderness.  No pain or limitation with range of motion. Back: No tenderness. Straight leg test negative bilaterally. left shoulder: There is tenderness anteriorly and laterally. Active abduction and active flexion are 0-120 degrees but with pain and guarding. There is pain with and limitation of active and passive internal and external rotation. Comparments are soft. Neurovascular is intact.     AP lateral x-ray of the left shoulder done on 10/29/2023 and again on 11/3/2023 show no evidence of acute fracture or bone destruction.    MR shoulder left wo IV contrast  listed below is reviewed with the patient in the office today.    Result Date: 1/26/2024  Interpreted By:  Gisele Valera, STUDY: MR SHOULDER LEFT WO IV CONTRAST; ;  1/25/2024 5:55 pm   INDICATION: Signs/Symptoms:left shoulder pain.   COMPARISON: X-ray 11/03/2023   ACCESSION NUMBER(S): [id number]   ORDERING CLINICIAN: JAMIE EID   TECHNIQUE: Multiplanar multisequence MRI obtained of the left shoulder.   FINDINGS: Supraspinatus tendon is unremarkable. Supraspinatus muscle is unremarkable   Infraspinatus tendon is unremarkable. Infraspinatus muscle is unremarkable. There is minimal subcortical cystic change about the middle facet greater tuberosity of the humerus.   Subscapularis tendon is unremarkable. Subscapularis muscle is unremarkable   Long head of the biceps tendon is unremarkable within the bicipital groove. Intra-articular portion of the tendon proximally is intact to the labrum anchor. There is no edema in the rotator interval.   Glenohumeral articulation demonstrates no effusion. Glenoid labrum is unremarkable. There is no detachment of the labrum. Axillary pouch is unremarkable pre quadrilateral space is unremarkable.   No subacromial subdeltoid fluid. Acromioclavicular joint is unremarkable. A type 1 acromion is seen. Musculature about the shoulder girdle unremarkable.           1. Unremarkable MRI of the left shoulder.     MACRO: None   Signed by:  TONYjekojeannine Valera 1/26/2024 7:56 AM Dictation workstation:   [id number]      Jacqui was seen today for pain.  Diagnoses and all orders for this visit:  Left shoulder pain, unspecified chronicity (Primary)  Sprain of left shoulder, unspecified shoulder sprain type, initial encounter     Options are discussed with the patient in detail. The patient is instructed to continue with physical therapy.  The patient is instructed regarding activity modification and risk for further injury with falling or trauma, ice, provider directed at home gentle strengthening and ROM exercises, and the appropriate use of Tylenol as needed for pain with its potential adverse reactions and side effects. The patient understands. Return to work March 18th 2024.  Return  in 5 weeks or sooner as needed  please note that this report has been produced using speech recognition software.  It may contain errors related to grammar, punctuation or spelling.  Electronically signed, but not reviewed.    Tyrese Garcia MD

## 2024-02-05 NOTE — LETTER
February 5, 2024     Patient: Jacqui Spaulding   YOB: 2000   Date of Visit: 2/5/2024       To Whom It May Concern:    It is my medical opinion that Jacqui Spaulding may return to work on March 18th 2024 .    If you have any questions or concerns, please don't hesitate to call.         Sincerely,        Tyrese Garcia MD    CC: No Recipients

## 2024-03-14 ENCOUNTER — TELEPHONE (OUTPATIENT)
Dept: ORTHOPEDIC SURGERY | Facility: CLINIC | Age: 24
End: 2024-03-14
Payer: COMMERCIAL